# Patient Record
Sex: FEMALE
[De-identification: names, ages, dates, MRNs, and addresses within clinical notes are randomized per-mention and may not be internally consistent; named-entity substitution may affect disease eponyms.]

---

## 2019-10-23 ENCOUNTER — APPOINTMENT (OUTPATIENT)
Dept: ENDOCRINOLOGY | Facility: CLINIC | Age: 54
End: 2019-10-23
Payer: COMMERCIAL

## 2019-10-23 VITALS
BODY MASS INDEX: 27.23 KG/M2 | WEIGHT: 148 LBS | DIASTOLIC BLOOD PRESSURE: 67 MMHG | HEART RATE: 85 BPM | HEIGHT: 62 IN | SYSTOLIC BLOOD PRESSURE: 131 MMHG

## 2019-10-23 PROBLEM — Z00.00 ENCOUNTER FOR PREVENTIVE HEALTH EXAMINATION: Status: ACTIVE | Noted: 2019-10-23

## 2019-10-23 PROCEDURE — 99205 OFFICE O/P NEW HI 60 MIN: CPT

## 2019-10-23 NOTE — ADDENDUM
[FreeTextEntry1] : I, Carlito Perez, acted solely as a scribe for Dr. Jonathan Benoit on this date. 10/23/2019.

## 2019-10-23 NOTE — PHYSICAL EXAM
[Alert] : alert [Normal Sclera/Conjunctiva] : normal sclera/conjunctiva [Normal Outer Ear/Nose] : the ears and nose were normal in appearance [No Neck Mass] : no neck mass was observed [Normal Rate and Effort] : normal respiratory rhythm and effort [Normal Rate] : heart rate was normal  [No Spinal Tenderness] : no spinal tenderness [Normal Bowel Sounds] : normal bowel sounds [No Edema] : there was no peripheral edema [Normal Gait] : normal gait [No Rash] : no rash [Normal Reflexes] : deep tendon reflexes were 2+ and symmetric [Oriented x3] : oriented to person, place, and time

## 2019-10-23 NOTE — HISTORY OF PRESENT ILLNESS
[FreeTextEntry1] : 54 year old female pt, /67, BMI 27.07, with Hx of Secondary Hypothyroidism with OLSEN (dx in ) and recent elevation of A1c 6.6%, referred by Dr Rishi Will, presents today to establish endocrine care with me.\par Other PMHx: HTN and Depression\par FHx: Parent and siblings with Fhx of DM. HTN(mother)\par SHx: no tobacco/etOH use\par \par Pt's endocrinologist is Dr. Stanislaw Kang \par \par Pt visited her PCP 2 months ago for a general physical exam, she was informed her A1c was 6.6%. \par Pt is feeling well with no c/o. She denies symptoms of osmotic diuresis, palpitation, cold/heat intolerance, and GI disturbances. \par \par \par Current Medications:  Levothyroxine 88mcg, Risperidone 4mg, Atorvastatin 20mg

## 2019-10-23 NOTE — ASSESSMENT
[FreeTextEntry1] : 53 y/o F with\par \par 1) Elevation of A1c 6.6% (7/2019):\par Pt is gaining weight, she is on Risperidone. Will assess her cardiac metabolic profile.\par Recommend to have a better lifestyle and diet.\par \par 2) Hx of secondary hypothyroidism followed with OLSEN:\par Pt's endocrinologist is Dr. Stanislaw Kang.\par TFTs ordered today.\par \par f/u in 4 weeks [Importance of Diet and Exercise] : importance of diet and exercise to improve glycemic control, achieve weight loss and improve cardiovascular health [Levothyroxine] : The patient was instructed to take Levothyroxine on an empty stomach, separate from vitamins, and wait at least 30 minutes before eating

## 2019-10-23 NOTE — REVIEW OF SYSTEMS
[As Noted in HPI] : as noted in HPI [Negative] : Endocrine [Palpitations] : no palpitations [Cold Intolerance] : cold tolerant [Heat Intolerance] : heat tolerant [FreeTextEntry2] : denies symptoms of osmotic diuresis  [FreeTextEntry7] : denies GI disturbances

## 2019-10-23 NOTE — CONSULT LETTER
[( Thank you for referring [unfilled] for consultation for _____ )] : Thank you for referring [unfilled] for consultation for [unfilled] [Dear  ___] : Dear  [unfilled],

## 2019-10-23 NOTE — END OF VISIT
[FreeTextEntry3] : All medical record entries made by the Scribe were at my, Dr. Jonathan Benoit, direction and personally dictated by me on 10/23/2019 I have reviewed the chart and agree that the record accurately reflects my personal performance of the history, physical exam, assessment and plan. I have also personally directed, reviewed and agreed with the chart.  [>50% of Time Spent on Counseling for ____] : Greater than 50% of the encounter time was spent on counseling for [unfilled] [Time Spent: ___ minutes] : I have spent [unfilled] minutes of face to face time with the patient

## 2019-11-27 ENCOUNTER — APPOINTMENT (OUTPATIENT)
Dept: ENDOCRINOLOGY | Facility: CLINIC | Age: 54
End: 2019-11-27
Payer: COMMERCIAL

## 2019-11-27 VITALS
DIASTOLIC BLOOD PRESSURE: 67 MMHG | SYSTOLIC BLOOD PRESSURE: 123 MMHG | BODY MASS INDEX: 25.87 KG/M2 | WEIGHT: 146 LBS | HEIGHT: 63 IN | OXYGEN SATURATION: 100 % | HEART RATE: 88 BPM

## 2019-11-27 DIAGNOSIS — Z86.59 PERSONAL HISTORY OF OTHER MENTAL AND BEHAVIORAL DISORDERS: ICD-10-CM

## 2019-11-27 DIAGNOSIS — Z86.39 PERSONAL HISTORY OF OTHER ENDOCRINE, NUTRITIONAL AND METABOLIC DISEASE: ICD-10-CM

## 2019-11-27 PROCEDURE — 99213 OFFICE O/P EST LOW 20 MIN: CPT

## 2019-11-27 RX ORDER — RISPERIDONE 2 MG/1
2 TABLET, FILM COATED ORAL
Refills: 0 | Status: ACTIVE | COMMUNITY

## 2019-11-27 RX ORDER — ATORVASTATIN CALCIUM 20 MG/1
20 TABLET, FILM COATED ORAL
Refills: 0 | Status: ACTIVE | COMMUNITY

## 2019-11-27 RX ORDER — CITALOPRAM HYDROBROMIDE 20 MG/1
20 TABLET, FILM COATED ORAL
Refills: 0 | Status: ACTIVE | COMMUNITY

## 2019-11-27 NOTE — PHYSICAL EXAM
[Alert] : alert [No Neck Mass] : no neck mass was observed [Thyroid Not Enlarged] : the thyroid was not enlarged [No Respiratory Distress] : no respiratory distress [Normal Rate and Effort] : normal respiratory rhythm and effort [Clear to Auscultation] : lungs were clear to auscultation bilaterally [Normal S1, S2] : normal S1 and S2 [Normal Gait] : normal gait [Oriented x3] : oriented to person, place, and time

## 2019-11-27 NOTE — HISTORY OF PRESENT ILLNESS
[FreeTextEntry1] : Ms. CHERYLE SILVERMAN is 54 year old female here today for follow-up for hypothyroidism and pre-DM\par Last A1C 6.3% Seen by Kaycee Mehta previously \par Other PMHx: HTN and Depression\par Current Medications:  Levothyroxine 88mcg, Risperidone 4mg, Atorvastatin 20mg\par \par Pt is feeling well today with no complaints \par ROS all negative \par Is walking daily and has changed her dietary habit

## 2019-11-27 NOTE — PROCEDURE
[FreeTextEntry1] : 55yo female with pre-DM and hypothyroidism \par Labs reviewed \par Cont. on current dose of Levothyroxine \par  education provided at this visit on the importance of diet and exercise\par Reviewed plate method\par On Statin \par BP stable \par \par RTC in 3 months \par

## 2019-11-27 NOTE — ASSESSMENT
[Carbohydrate Consistent Diet] : carbohydrate consistent diet [Importance of Diet and Exercise] : importance of diet and exercise to improve glycemic control, achieve weight loss and improve cardiovascular health [Levothyroxine] : The patient was instructed to take Levothyroxine on an empty stomach, separate from vitamins, and wait at least 30 minutes before eating [FreeTextEntry1] : 55 y/o F with\par \par 1) Elevation of A1c 6.6% (7/2019):\par Pt is gaining weight, she is on Risperidone. Will assess her cardiac metabolic profile.\par Recommend to have a better lifestyle and diet.\par \par 2) Hx of secondary hypothyroidism followed with OLSEN:\par Pt's endocrinologist is Dr. Stanislaw Kang.\par TFTs ordered today.\par \par f/u in 4 weeks

## 2019-12-11 LAB
ALBUMIN SERPL ELPH-MCNC: 4.6 G/DL
ALP BLD-CCNC: 115 U/L
ALT SERPL-CCNC: 29 U/L
ANION GAP SERPL CALC-SCNC: 15 MMOL/L
AST SERPL-CCNC: 30 U/L
BILIRUB SERPL-MCNC: 0.2 MG/DL
BUN SERPL-MCNC: 7 MG/DL
CALCIUM SERPL-MCNC: 9.3 MG/DL
CHLORIDE SERPL-SCNC: 103 MMOL/L
CHOLEST SERPL-MCNC: 147 MG/DL
CHOLEST/HDLC SERPL: 4 RATIO
CO2 SERPL-SCNC: 23 MMOL/L
CREAT SERPL-MCNC: 0.65 MG/DL
CREAT SPEC-SCNC: 154 MG/DL
ESTIMATED AVERAGE GLUCOSE: 134 MG/DL
GLUCOSE SERPL-MCNC: 106 MG/DL
HBA1C MFR BLD HPLC: 6.3 %
HDLC SERPL-MCNC: 37 MG/DL
LDLC SERPL CALC-MCNC: 92 MG/DL
MICROALBUMIN 24H UR DL<=1MG/L-MCNC: 1.3 MG/DL
MICROALBUMIN/CREAT 24H UR-RTO: 8 MG/G
POTASSIUM SERPL-SCNC: 4.6 MMOL/L
PROT SERPL-MCNC: 6.8 G/DL
SODIUM SERPL-SCNC: 141 MMOL/L
T4 FREE SERPL-MCNC: 1.1 NG/DL
TRIGL SERPL-MCNC: 89 MG/DL
TSH SERPL-ACNC: 3.04 UIU/ML

## 2020-02-26 ENCOUNTER — APPOINTMENT (OUTPATIENT)
Dept: ENDOCRINOLOGY | Facility: CLINIC | Age: 55
End: 2020-02-26
Payer: COMMERCIAL

## 2020-02-26 VITALS
HEIGHT: 64 IN | HEART RATE: 86 BPM | SYSTOLIC BLOOD PRESSURE: 109 MMHG | DIASTOLIC BLOOD PRESSURE: 69 MMHG | WEIGHT: 142 LBS | OXYGEN SATURATION: 98 % | BODY MASS INDEX: 24.24 KG/M2

## 2020-02-26 DIAGNOSIS — R73.03 PREDIABETES.: ICD-10-CM

## 2020-02-26 DIAGNOSIS — E03.8 OTHER SPECIFIED HYPOTHYROIDISM: ICD-10-CM

## 2020-02-26 PROCEDURE — 99214 OFFICE O/P EST MOD 30 MIN: CPT

## 2020-02-26 NOTE — HISTORY OF PRESENT ILLNESS
[FreeTextEntry1] : - 10/23/19: A1c 6.3%, s.creat 0.65, TSH 3.04, Free T4 1.1, TG 89, LDL-c 92, Microalb/Creat Ratio 8\par \par 54 y/o F pt, with Hx of Secondary Hypothyroidism (dx in ) s/p OLSEN for Grave's disease,  and Hx of pre-DM (A1c 6.6% in 2019).\par Other PMHx: HTN and Depression\par FHx: DM (Parent and siblings), HTN(mother)\par SHx: no tobacco/etOH use\par \par Last Funduscopic visit; \par Pt's endocrinologist is Dr. Stanislaw Kang \par \par 10/23/19\par Pt visited her PCP 2 months ago for a general physical exam, she was informed her A1c was 6.6%. \par Pt is feeling well with no c/o. She denies symptoms of osmotic diuresis, palpitation, cold/heat intolerance, and GI disturbances. \par \par 2020 \par Pt presents today for endocrine f/u, feeling well with no acute complaints. Pt recently visited her PCP who informed her that her A1c is in pre-DM range. \par Denies changes in medication regimen since her last visit. Denies osmotic diuresis symptoms, palpitations, and GI disturbance. \par Pt has lost 4 lbs in last 3 months. \par \par Current Medications: Risperidone 4mg, Atorvastatin 20 mg, Levoxyl 88 mcg, Losartan 25 mg

## 2020-02-26 NOTE — PHYSICAL EXAM
[Alert] : alert [Normal Sclera/Conjunctiva] : normal sclera/conjunctiva [No Neck Mass] : no neck mass was observed [Normal Outer Ear/Nose] : the ears and nose were normal in appearance [Normal Rate] : heart rate was normal  [Normal Rate and Effort] : normal respiratory rhythm and effort [No Edema] : there was no peripheral edema [Normal Bowel Sounds] : normal bowel sounds [No Spinal Tenderness] : no spinal tenderness [No Rash] : no rash [Normal Gait] : normal gait [Normal Reflexes] : deep tendon reflexes were 2+ and symmetric [Oriented x3] : oriented to person, place, and time

## 2020-02-26 NOTE — ADDENDUM
[FreeTextEntry1] : I, Cara Gomez, acted solely as a scribe for Dr. Jonathan Benoit on this date. 02/26/2020.

## 2020-02-26 NOTE — END OF VISIT
[FreeTextEntry3] : All medical record entries made by the Scribe were at my, Dr. Jonathan Benoit, direction and personally dictated by me on 02/26/2020. I have reviewed the chart and agree that the record accurately reflects my personal performance of the history, physical exam, assessment and plan. I have also personally directed, reviewed and agreed with the chart.  [>50% of Time Spent on Counseling for ____] : Greater than 50% of the encounter time was spent on counseling for [unfilled] [Time Spent: ___ minutes] : I have spent [unfilled] minutes of face to face time with the patient

## 2020-02-26 NOTE — ASSESSMENT
[Importance of Diet and Exercise] : importance of diet and exercise to improve glycemic control, achieve weight loss and improve cardiovascular health [Levothyroxine] : The patient was instructed to take Levothyroxine on an empty stomach, separate from vitamins, and wait at least 30 minutes before eating [FreeTextEntry1] : 56 y/o F with\par \par 1) Hx of pre-DM; recent A1c 6.3% on 10/23/19:\par Pt is on Risperidone. She is on continued risk of developing DM. \par Explained the benefit of diet and lifestyle modification to pt again. \par Will order metabolic profile today. \par \par 2) Hx of secondary hypothyroidism (dx in 2007) s/p OLSEN ablation for Grave's disease: \par Pt is clinically euthyroid. \par Ordered TFTs today; will call pt with results. \par Advised pt to follow-up with her internist. \par \par Return in: 6 months

## 2020-02-27 LAB
ALBUMIN SERPL ELPH-MCNC: 4.8 G/DL
ALP BLD-CCNC: 109 U/L
ALT SERPL-CCNC: 21 U/L
ANION GAP SERPL CALC-SCNC: 14 MMOL/L
AST SERPL-CCNC: 20 U/L
BILIRUB SERPL-MCNC: 0.2 MG/DL
BUN SERPL-MCNC: 9 MG/DL
CALCIUM SERPL-MCNC: 9.4 MG/DL
CHLORIDE SERPL-SCNC: 106 MMOL/L
CHOLEST SERPL-MCNC: 146 MG/DL
CHOLEST/HDLC SERPL: 4.9 RATIO
CO2 SERPL-SCNC: 24 MMOL/L
CREAT SERPL-MCNC: 0.63 MG/DL
ESTIMATED AVERAGE GLUCOSE: 140 MG/DL
GLUCOSE SERPL-MCNC: 107 MG/DL
HBA1C MFR BLD HPLC: 6.5 %
HDLC SERPL-MCNC: 30 MG/DL
LDLC SERPL CALC-MCNC: 100 MG/DL
POTASSIUM SERPL-SCNC: 4.4 MMOL/L
PROT SERPL-MCNC: 7.2 G/DL
SODIUM SERPL-SCNC: 144 MMOL/L
T4 FREE SERPL-MCNC: 1.2 NG/DL
TRIGL SERPL-MCNC: 77 MG/DL
TSH SERPL-ACNC: 4.7 UIU/ML

## 2020-02-27 RX ORDER — LEVOTHYROXINE SODIUM 0.09 MG/1
88 TABLET ORAL
Qty: 90 | Refills: 3 | Status: ACTIVE | COMMUNITY
Start: 2019-11-27 | End: 1900-01-01

## 2020-05-28 ENCOUNTER — RX RENEWAL (OUTPATIENT)
Age: 55
End: 2020-05-28

## 2020-05-28 RX ORDER — METFORMIN HYDROCHLORIDE 850 MG/1
850 TABLET, COATED ORAL
Qty: 90 | Refills: 3 | Status: ACTIVE | COMMUNITY
Start: 2020-02-27 | End: 1900-01-01